# Patient Record
Sex: MALE | Race: BLACK OR AFRICAN AMERICAN | NOT HISPANIC OR LATINO | URBAN - METROPOLITAN AREA
[De-identification: names, ages, dates, MRNs, and addresses within clinical notes are randomized per-mention and may not be internally consistent; named-entity substitution may affect disease eponyms.]

---

## 2017-01-11 ENCOUNTER — APPOINTMENT (RX ONLY)
Dept: URBAN - METROPOLITAN AREA CLINIC 355 | Facility: CLINIC | Age: 42
Setting detail: DERMATOLOGY
End: 2017-01-11

## 2017-01-11 DIAGNOSIS — L73.0 ACNE KELOID: ICD-10-CM

## 2017-01-11 DIAGNOSIS — L70.8 OTHER ACNE: ICD-10-CM

## 2017-01-11 PROCEDURE — ? TREATMENT REGIMEN

## 2017-01-11 PROCEDURE — ? INTRALESIONAL KENALOG

## 2017-01-11 PROCEDURE — 99213 OFFICE O/P EST LOW 20 MIN: CPT | Mod: 25

## 2017-01-11 PROCEDURE — 11900 INJECT SKIN LESIONS </W 7: CPT

## 2017-01-11 ASSESSMENT — LOCATION ZONE DERM
LOCATION ZONE: SCALP
LOCATION ZONE: NECK

## 2017-01-11 ASSESSMENT — LOCATION SIMPLE DESCRIPTION DERM
LOCATION SIMPLE: POSTERIOR SCALP
LOCATION SIMPLE: POSTERIOR NECK

## 2017-01-11 ASSESSMENT — LOCATION DETAILED DESCRIPTION DERM
LOCATION DETAILED: MID-OCCIPITAL SCALP
LOCATION DETAILED: MID POSTERIOR NECK

## 2017-01-11 NOTE — PROCEDURE: INTRALESIONAL KENALOG
Concentration Of Solution Injected (Mg/Ml): 20.0
Detail Level: Simple
Size Of Lesion (Optional): 0
Kenalog Preparation: Kenalog with normal saline
Consent: The risks of atrophy were reviewed with the patient.
Total Volume Injected (Ccs- Only Use Numbers And Decimals): 0.5

## 2017-01-11 NOTE — PROCEDURE: TREATMENT REGIMEN
Plan: Fluocinonide cream daily as needed for flares particularly after hair cuts
Detail Level: Simple

## 2017-04-12 ENCOUNTER — APPOINTMENT (RX ONLY)
Dept: URBAN - METROPOLITAN AREA CLINIC 355 | Facility: CLINIC | Age: 42
Setting detail: DERMATOLOGY
End: 2017-04-12

## 2017-04-12 DIAGNOSIS — L73.0 ACNE KELOID: ICD-10-CM

## 2017-04-12 DIAGNOSIS — L73.1 PSEUDOFOLLICULITIS BARBAE: ICD-10-CM | Status: WELL CONTROLLED

## 2017-04-12 PROCEDURE — 99213 OFFICE O/P EST LOW 20 MIN: CPT | Mod: 25

## 2017-04-12 PROCEDURE — ? COUNSELING

## 2017-04-12 PROCEDURE — ? TREATMENT REGIMEN

## 2017-04-12 PROCEDURE — 11900 INJECT SKIN LESIONS </W 7: CPT

## 2017-04-12 PROCEDURE — ? INTRALESIONAL KENALOG

## 2017-04-12 ASSESSMENT — LOCATION ZONE DERM
LOCATION ZONE: NECK
LOCATION ZONE: SCALP

## 2017-04-12 ASSESSMENT — LOCATION DETAILED DESCRIPTION DERM
LOCATION DETAILED: LEFT INFERIOR OCCIPITAL SCALP
LOCATION DETAILED: RIGHT INFERIOR OCCIPITAL SCALP
LOCATION DETAILED: MID POSTERIOR NECK

## 2017-04-12 ASSESSMENT — LOCATION SIMPLE DESCRIPTION DERM
LOCATION SIMPLE: POSTERIOR SCALP
LOCATION SIMPLE: POSTERIOR NECK

## 2017-04-12 NOTE — PROCEDURE: INTRALESIONAL KENALOG
Medical Necessity Clause: This procedure was medically necessary because the lesions that were treated were:
Include Z78.9 (Other Specified Conditions Influencing Health Status) As An Associated Diagnosis?: No
X Size Of Lesion In Cm (Optional): 0
Detail Level: Simple
Consent: The risks of atrophy were reviewed with the patient.
Concentration Of Solution Injected (Mg/Ml): 20.0
Kenalog Preparation: Kenalog with normal saline
Total Volume Injected (Ccs- Only Use Numbers And Decimals): 0.5